# Patient Record
Sex: FEMALE | URBAN - METROPOLITAN AREA
[De-identification: names, ages, dates, MRNs, and addresses within clinical notes are randomized per-mention and may not be internally consistent; named-entity substitution may affect disease eponyms.]

---

## 2017-04-12 DIAGNOSIS — J45.41 MODERATE PERSISTENT ASTHMA WITH ACUTE EXACERBATION: ICD-10-CM

## 2017-04-12 NOTE — TELEPHONE ENCOUNTER
beclomethasone (QVAR) 40 MCG/ACT Inhaler       Last Written Prescription Date: 1/19/16  Last Fill Quantity: 1, # refills: 1    Last Office Visit with [facility], [facility] or [facility] prescribing provider:  1/14/16   Future Office Visit:       Date of Last Asthma Action Plan Letter:   Asthma Action Plan Q1 Year    Topic Date Due     Asthma Action Plan - yearly  08/13/2011      Asthma Control Test:   ACT Total Scores 1/14/2016   ACT TOTAL SCORE (Goal Greater than or Equal to 20) 10   In the past 12 months, how many times did you visit the emergency room for your asthma without being admitted to the hospital? 0   In the past 12 months, how many times were you hospitalized overnight because of your asthma? 0       Date of Last Spirometry Test:   No results found for this or any previous visit.            Stef Faarax  [facility]

## 2017-04-13 NOTE — TELEPHONE ENCOUNTER
Routing refill request to provider for review/approval because:  Labs out of range:  Last ACT was less than 20.  Tawnya Linares RN

## 2017-04-17 ENCOUNTER — TELEPHONE (OUTPATIENT)
Dept: FAMILY MEDICINE | Facility: CLINIC | Age: 22
End: 2017-04-17

## 2017-04-17 DIAGNOSIS — J45.41 MODERATE PERSISTENT ASTHMA WITH ACUTE EXACERBATION: ICD-10-CM

## 2017-04-17 NOTE — TELEPHONE ENCOUNTER
Plan does not cover beclomethasone (QVAR) 40 MCG/ACT Inhaler.  Please call [phone] to initiate Prior Auth or change med.      ID# [id number]      Anaya Negro  [facility]

## 2017-04-17 NOTE — LETTER
[address] [address]  [phone]      April 20, 2017      Rachel Redding  [address] [place] [id number]        Dear Rachel,    Our records show you are due for a Asthma check with your provider.     Please call the scheduling line at, [phone].          Sincerely,    Your Care Team at [facility]/yenny

## 2017-04-18 NOTE — TELEPHONE ENCOUNTER
Pharmacy is now faxing over PA request for:    Plan does not cover fluticasone (FLOVENT DISKUS) 100 MCG/BLIST AEPB.  Please call [phone] to initiate Prior Auth or change med.      ID# [id number]      [facility]

## 2017-04-19 NOTE — TELEPHONE ENCOUNTER
These are the medications that is covered without needing a PA:    Short acting:  Albuterol solution  Ventolin HSA    Long acting:  striverdi respimat inhaler  Serevent inhaler    Routing to provider.  Brittany Caban MA

## 2017-04-20 NOTE — TELEPHONE ENCOUNTER
Spoke to PA representative, unfortunately nothing is covered through the insurance all inhaled steroids will need a PA.  Routing to provider to advise.  Brittany Caban MA

## 2017-04-20 NOTE — TELEPHONE ENCOUNTER
Call to patient, unable to leave a message since mail box is full.     Will send a letter to pt. Asking her to make an appointment,     Addis Brandt CMA

## 2017-04-20 NOTE — TELEPHONE ENCOUNTER
Patient hasn't been seen for over a year and ulises refill was being given to schedule.  Since PA is needed, patient will need to schedule an appointment and be seen prior to this being completed.

## 2017-04-20 NOTE — TELEPHONE ENCOUNTER
Another Prior Auth request for salmeterol (SEREVENT DISKUS) 50 MCG/DOSE diskus inhaler is being sent by the pharmacy.

## 2017-04-28 ENCOUNTER — TELEPHONE (OUTPATIENT)
Dept: FAMILY MEDICINE | Facility: CLINIC | Age: 22
End: 2017-04-28

## 2017-04-28 NOTE — TELEPHONE ENCOUNTER
Reason for Call:  Other prescription    Detailed comments: [facility] calling for they are requiring additional questions being answered regarding Pt's flovent diskus to complete Prior Auth    Phone Number [facility] can be reached at: Other phone number:  [phone]    Best Time: Anytime    Can we leave a detailed message on this number? YES    Call taken on 4/28/2017 at 3:08 PM by Elvin Hill

## 2017-05-01 NOTE — TELEPHONE ENCOUNTER
Avtar Granados, spoke with Nathaly, who told me that the plan has changed since last month,     Serevent is covered under the current plan.      Addis Brandt CMA